# Patient Record
Sex: FEMALE | Race: WHITE | URBAN - METROPOLITAN AREA
[De-identification: names, ages, dates, MRNs, and addresses within clinical notes are randomized per-mention and may not be internally consistent; named-entity substitution may affect disease eponyms.]

---

## 2021-06-17 ENCOUNTER — OFFICE VISIT (OUTPATIENT)
Dept: URBAN - METROPOLITAN AREA CLINIC 31 | Facility: CLINIC | Age: 81
End: 2021-06-17
Payer: MEDICARE

## 2021-06-17 DIAGNOSIS — H43.813 VITREOUS DEGENERATION, BILATERAL: ICD-10-CM

## 2021-06-17 DIAGNOSIS — H35.3132 NONEXUDATIVE AGE-RELATED MACULAR DEGENERATION, BILATERAL, INTERMEDIATE DRY STAGE: ICD-10-CM

## 2021-06-17 DIAGNOSIS — H43.811 VITREOUS DEGENERATION, RIGHT EYE: ICD-10-CM

## 2021-06-17 PROCEDURE — 92012 INTRM OPH EXAM EST PATIENT: CPT | Performed by: OPHTHALMOLOGY

## 2021-06-17 PROCEDURE — 92134 CPTRZ OPH DX IMG PST SGM RTA: CPT | Performed by: OPHTHALMOLOGY

## 2021-06-17 PROCEDURE — 67028 INJECTION EYE DRUG: CPT | Performed by: OPHTHALMOLOGY

## 2021-06-17 ASSESSMENT — INTRAOCULAR PRESSURE
OD: 12
OS: 10

## 2021-06-17 NOTE — IMPRESSION/PLAN
Impression: Trib rtnl vein occlusion, right eye, with macular edema: C13.3616.
s/p Avastin OD 3/22/18
- new CRVO OD 6/17/2021 Plan: Patient notes a decline in vision over the last 3 wks. Exam and OCT confirm edema with new CRVO. No NVD/NVE. Discussed r/b/a of antiVEGF vs laser vs observation. Recommend Avastin injection OD today. R/B/A discussed and patient elects to proceed. Intravitreal Avastin injection was performed today OD without complication. 6 wks with OCT OU re-eval Avastin OD.

## 2021-07-29 ENCOUNTER — OFFICE VISIT (OUTPATIENT)
Dept: URBAN - METROPOLITAN AREA CLINIC 31 | Facility: CLINIC | Age: 81
End: 2021-07-29
Payer: MEDICARE

## 2021-07-29 DIAGNOSIS — H35.3122 NONEXUDATIVE AGE-RELATED MACULAR DEGENERATION, LEFT EYE, INTERMEDIATE DRY STAGE: ICD-10-CM

## 2021-07-29 DIAGNOSIS — H35.3211 EXUDATIVE AGE-RELATED MACULAR DEGENERATION, RIGHT EYE, WITH ACTIVE CHOROIDAL NEOVASCULARIZATION: ICD-10-CM

## 2021-07-29 PROCEDURE — 92134 CPTRZ OPH DX IMG PST SGM RTA: CPT | Performed by: OPHTHALMOLOGY

## 2021-07-29 PROCEDURE — 99214 OFFICE O/P EST MOD 30 MIN: CPT | Performed by: OPHTHALMOLOGY

## 2021-07-29 ASSESSMENT — INTRAOCULAR PRESSURE
OD: 13
OS: 12

## 2021-07-29 NOTE — IMPRESSION/PLAN
Impression: Trib rtnl vein occlusion, right eye, with macular edema: Z74.5181.
s/p Avastin OD 3/22/18
- new CRVO OD 6/17/2021 Plan: Patient noted a decline in vision over the with onset of the new CRVO; however, there has not been any change with antiVEGF injection # 1. Exam and OCT confirm continued minimal CME after CRVO. No NVD/NVE. Discussed r/b/a of antiVEGF vs laser vs observation. Recommend observation today. R/B/A discussed and patient elects to observe. 6-8 wks with OCT OU re-eval Avastin OD.

## 2021-07-29 NOTE — IMPRESSION/PLAN
Impression: Glaucoma Suspect: H40.9. Plan: Strong family history. Patient started on timolol 7/28/2021 with Dr. Bao James. Rec observation.

## 2021-09-23 ENCOUNTER — OFFICE VISIT (OUTPATIENT)
Dept: URBAN - METROPOLITAN AREA CLINIC 31 | Facility: CLINIC | Age: 81
End: 2021-09-23
Payer: MEDICARE

## 2021-09-23 PROCEDURE — 92134 CPTRZ OPH DX IMG PST SGM RTA: CPT | Performed by: OPHTHALMOLOGY

## 2021-09-23 PROCEDURE — 92014 COMPRE OPH EXAM EST PT 1/>: CPT | Performed by: OPHTHALMOLOGY

## 2021-09-23 ASSESSMENT — INTRAOCULAR PRESSURE
OS: 10
OD: 11

## 2021-09-23 NOTE — IMPRESSION/PLAN
Impression: Trib rtnl vein occlusion, right eye, with macular edema: O46.3511.
s/p Avastin OD 07/26/2018
- new CRVO OD 6/17/2021, - s/p Avastin OD 6/17/2021 Plan: Patient noted a decline in vision over the with onset of the new CRVO; however, there has not been any change with antiVEGF injection # 1. Exam and OCT confirm slight recurrence of minimal CME 3 mo after Avastin after CRVO. No NVD/NVE. Discussed r/b/a of antiVEGF vs laser vs observation. Recommend observation today. R/B/A discussed and patient elects to observe. Discussed that repeat injections would be required if CME worsens. 4 wks with OCT OU re-eval Avastin OD.

## 2021-09-23 NOTE — IMPRESSION/PLAN
Impression: Glaucoma Suspect: H40.9. Plan: Strong family history. Patient started on timolol 7/28/2021 with Dr. Natalie Mcclain. Benjamin Milner was recently added as well. Rec observation.

## 2021-10-28 ENCOUNTER — OFFICE VISIT (OUTPATIENT)
Dept: URBAN - METROPOLITAN AREA CLINIC 31 | Facility: CLINIC | Age: 81
End: 2021-10-28
Payer: MEDICARE

## 2021-10-28 DIAGNOSIS — Z96.1 PRESENCE OF INTRAOCULAR LENS: ICD-10-CM

## 2021-10-28 PROCEDURE — 67028 INJECTION EYE DRUG: CPT | Performed by: OPHTHALMOLOGY

## 2021-10-28 PROCEDURE — 92134 CPTRZ OPH DX IMG PST SGM RTA: CPT | Performed by: OPHTHALMOLOGY

## 2021-10-28 PROCEDURE — 92012 INTRM OPH EXAM EST PATIENT: CPT | Performed by: OPHTHALMOLOGY

## 2021-10-28 ASSESSMENT — INTRAOCULAR PRESSURE
OS: 10
OD: 19

## 2021-10-28 NOTE — IMPRESSION/PLAN
Impression: Trib rtnl vein occlusion, right eye, with macular edema: Q48.7950.
s/p Avastin OD 07/26/2018
- new CRVO OD 6/17/2021, - s/p Avastin OD 06/17/2021 Plan: Patient noted a decline in vision over the last month extending from 3 mo to 4 mo after Avastin #1. Exam and OCT confirm worse recurrence of CME 4 mo after Avastin after CRVO. No NVD/NVE. Discussed r/b/a of antiVEGF vs laser vs observation. Recommend Avastin today and taper to 3 mo appt. R/B/A discussed and patient elects to proceed. Intravitreal Avastin injected OD without complication. 3 mo with OCT OU re-eval Avastin OD.

## 2021-10-28 NOTE — IMPRESSION/PLAN
Impression: Glaucoma Suspect: H40.9.
- possible allergy to Vyzulta Plan: Strong family history. Patient started on timolol 7/28/2021 with Dr. Umm Mariano. She is now off all drops. IOP 19 today. Rec observation and follow up with Dr. Umm Mariano.

## 2022-01-27 ENCOUNTER — OFFICE VISIT (OUTPATIENT)
Dept: URBAN - METROPOLITAN AREA CLINIC 31 | Facility: CLINIC | Age: 82
End: 2022-01-27
Payer: MEDICARE

## 2022-01-27 PROCEDURE — 92012 INTRM OPH EXAM EST PATIENT: CPT | Performed by: OPHTHALMOLOGY

## 2022-01-27 PROCEDURE — 67028 INJECTION EYE DRUG: CPT | Performed by: OPHTHALMOLOGY

## 2022-01-27 PROCEDURE — 92134 CPTRZ OPH DX IMG PST SGM RTA: CPT | Performed by: OPHTHALMOLOGY

## 2022-01-27 ASSESSMENT — INTRAOCULAR PRESSURE
OS: 9
OD: 15

## 2022-01-27 NOTE — IMPRESSION/PLAN
Impression: Glaucoma Suspect: H40.9.
- possible allergy to Vyzulta Plan: Strong family history. Patient started on timolol 7/28/2021 with Dr. Matthew Walker. She is now off all drops. IOP 15 today. Rec observation and follow up with Dr. Matthew Walker.

## 2022-01-27 NOTE — IMPRESSION/PLAN
Impression: Trib rtnl vein occlusion, right eye, with macular edema: H11.8613.
s/p Avastin OD 07/26/2018
- new CRVO OD 6/17/2021, - s/p Avastin OD 10/28/2021 Plan: Patient noted a decline in vision over the last month even from tapering from 4 mo to 3 mo after Avastin. Exam and OCT confirm worse CME after Avastin after CRVO. No NVD/NVE. Discussed r/b/a of antiVEGF vs laser vs observation. Recommend Avastin today and taper to 2 mo appt. R/B/A discussed and patient elects to proceed. Intravitreal Avastin injected OD without complication. 2 mo with OCT OU re-eval Avastin OD.

## 2022-03-24 ENCOUNTER — OFFICE VISIT (OUTPATIENT)
Dept: URBAN - METROPOLITAN AREA CLINIC 31 | Facility: CLINIC | Age: 82
End: 2022-03-24
Payer: MEDICARE

## 2022-03-24 DIAGNOSIS — H40.9 GLAUCOMA: ICD-10-CM

## 2022-03-24 DIAGNOSIS — H34.8310 TRIB RTNL VEIN OCCLUSION, RIGHT EYE, WITH MACULAR EDEMA: ICD-10-CM

## 2022-03-24 PROCEDURE — 92012 INTRM OPH EXAM EST PATIENT: CPT | Performed by: OPHTHALMOLOGY

## 2022-03-24 PROCEDURE — 67028 INJECTION EYE DRUG: CPT | Performed by: OPHTHALMOLOGY

## 2022-03-24 PROCEDURE — 92134 CPTRZ OPH DX IMG PST SGM RTA: CPT | Performed by: OPHTHALMOLOGY

## 2022-03-24 ASSESSMENT — INTRAOCULAR PRESSURE
OS: 17
OD: 17

## 2022-03-24 NOTE — IMPRESSION/PLAN
Impression: Glaucoma Suspect: H40.9.
- possible allergy to Vyzulta Plan: Strong family history. Patient started on timolol 7/28/2021 with Dr. Darlyn Jackson. She is now off all drops. IOP 15 today. Rec observation and follow up with Dr. Darlyn Jackson.

## 2022-03-24 NOTE — IMPRESSION/PLAN
Impression: Trib rtnl vein occlusion, right eye, with macular edema: P97.4876.
s/p Avastin OD 07/26/2018
- new CRVO OD 6/17/2021
- s/p Avastin OD 01/27/2022 Plan: Patient notes an improvement in vision tapering from 3 mo to 2 mo after Avastin. Exam and OCT confirm improved CME after Avastin after CRVO. No NVD/NVE. Discussed r/b/a of antiVEGF vs laser vs observation. Recommend Avastin today and taper to 2 mo appt. R/B/A discussed and patient elects to proceed. Intravitreal Avastin injected OD without complication. 2 mo with OCT OU re-eval Avastin OD.

## 2022-03-24 NOTE — IMPRESSION/PLAN
Impression: Vitreous degeneration, bilateral: H43.813. Plan: Patient notes occ floaters. Exam demonstrates a PVD without any RD or RT on exam.  Discussed R/B/A of PPV vs observation for the floaters. The floaters are not debilitating and so observation was recommended. Reassurance provided. RDW discussed. Precautions discussed with the patient.

## 2022-06-02 ENCOUNTER — OFFICE VISIT (OUTPATIENT)
Dept: URBAN - METROPOLITAN AREA CLINIC 31 | Facility: CLINIC | Age: 82
End: 2022-06-02
Payer: MEDICARE

## 2022-06-02 DIAGNOSIS — H35.3132 NONEXUDATIVE AGE-RELATED MACULAR DEGENERATION, BILATERAL, INTERMEDIATE DRY STAGE: ICD-10-CM

## 2022-06-02 DIAGNOSIS — H43.813 VITREOUS DEGENERATION, BILATERAL: ICD-10-CM

## 2022-06-02 DIAGNOSIS — H34.8310 TRIB RTNL VEIN OCCLUSION, RIGHT EYE, WITH MACULAR EDEMA: ICD-10-CM

## 2022-06-02 DIAGNOSIS — Z96.1 PRESENCE OF INTRAOCULAR LENS: Primary | ICD-10-CM

## 2022-06-02 DIAGNOSIS — H40.9 GLAUCOMA: ICD-10-CM

## 2022-06-02 PROCEDURE — 67028 INJECTION EYE DRUG: CPT | Performed by: OPHTHALMOLOGY

## 2022-06-02 PROCEDURE — 92012 INTRM OPH EXAM EST PATIENT: CPT | Performed by: OPHTHALMOLOGY

## 2022-06-02 PROCEDURE — 92134 CPTRZ OPH DX IMG PST SGM RTA: CPT | Performed by: OPHTHALMOLOGY

## 2022-06-02 ASSESSMENT — INTRAOCULAR PRESSURE
OD: 15
OS: 13

## 2022-06-02 NOTE — IMPRESSION/PLAN
Impression: Glaucoma Suspect: H40.9.
- possible allergy to Vyzulta Plan: Strong family history. Patient started on timolol 7/28/2021 with Dr. Wes Rowe. She is now off all drops. IOP 15 today. Rec observation and follow up with Dr. Wes Rowe.

## 2022-06-02 NOTE — IMPRESSION/PLAN
Impression: Trib rtnl vein occlusion, right eye, with macular edema: I54.1676.
s/p Avastin OD 07/26/2018
- new CRVO OD 6/17/2021
- s/p Avastin OD 03/24/2022 Plan: Patient notes an improvement in vision tapering from 3 mo to 10 wks after Avastin. Exam and OCT confirm improved CME after Avastin after CRVO. No NVD/NVE. Discussed r/b/a of antiVEGF vs laser vs observation. Recommend Avastin today and maintain 10 wk appt. R/B/A discussed and patient elects to proceed. Intravitreal Avastin injected OD without complication. 10 wks with OCT OU re-eval Avastin OD.

## 2022-08-11 ENCOUNTER — OFFICE VISIT (OUTPATIENT)
Dept: URBAN - METROPOLITAN AREA CLINIC 31 | Facility: CLINIC | Age: 82
End: 2022-08-11
Payer: MEDICARE

## 2022-08-11 DIAGNOSIS — H34.8310 TRIB RTNL VEIN OCCLUSION, RIGHT EYE, WITH MACULAR EDEMA: Primary | ICD-10-CM

## 2022-08-11 DIAGNOSIS — H40.9 GLAUCOMA: ICD-10-CM

## 2022-08-11 DIAGNOSIS — H43.813 VITREOUS DEGENERATION, BILATERAL: ICD-10-CM

## 2022-08-11 DIAGNOSIS — H35.3132 NONEXUDATIVE AGE-RELATED MACULAR DEGENERATION, BILATERAL, INTERMEDIATE DRY STAGE: ICD-10-CM

## 2022-08-11 DIAGNOSIS — Z96.1 PRESENCE OF INTRAOCULAR LENS: ICD-10-CM

## 2022-08-11 PROCEDURE — 99214 OFFICE O/P EST MOD 30 MIN: CPT | Performed by: OPHTHALMOLOGY

## 2022-08-11 PROCEDURE — 92134 CPTRZ OPH DX IMG PST SGM RTA: CPT | Performed by: OPHTHALMOLOGY

## 2022-08-11 ASSESSMENT — INTRAOCULAR PRESSURE
OS: 13
OD: 18

## 2022-08-11 NOTE — IMPRESSION/PLAN
Impression: Glaucoma Suspect: H40.9.
- possible allergy to Vyzulta Plan: Strong family history. Patient started on timolol 7/28/2021 with Dr. Bridgette Shannon office. She is now off all drops. IOP 15 today.   Rec observation and follow up

## 2022-08-11 NOTE — IMPRESSION/PLAN
Impression: Trib rtnl vein occlusion, right eye, with macular edema: H34.8310.
-s/p Avastin OD 07/26/2018
- new CRVO OD 6/17/2021
- s/p Avastin OD 06/02/2022 Plan: In past, patient has noted an improvement in vision tapering from 3 mo to 10 wks after Avastin. Exam and OCT confirm improved CME after Avastin 10 mo ago. No NVD/NVE. Discussed r/b/a of antiVEGF vs laser vs observation. Patient elects close observation. Warning symptoms discussed. 2-4 wks with OCT OU re-eval Avastin OD.

## 2022-09-15 ENCOUNTER — OFFICE VISIT (OUTPATIENT)
Dept: URBAN - METROPOLITAN AREA CLINIC 31 | Facility: CLINIC | Age: 82
End: 2022-09-15
Payer: MEDICARE

## 2022-09-15 DIAGNOSIS — Z96.1 PRESENCE OF INTRAOCULAR LENS: ICD-10-CM

## 2022-09-15 DIAGNOSIS — H34.8310 TRIB RTNL VEIN OCCLUSION, RIGHT EYE, WITH MACULAR EDEMA: Primary | ICD-10-CM

## 2022-09-15 DIAGNOSIS — H35.3132 NONEXUDATIVE AGE-RELATED MACULAR DEGENERATION, BILATERAL, INTERMEDIATE DRY STAGE: ICD-10-CM

## 2022-09-15 DIAGNOSIS — H43.813 VITREOUS DEGENERATION, BILATERAL: ICD-10-CM

## 2022-09-15 DIAGNOSIS — H40.9 GLAUCOMA: ICD-10-CM

## 2022-09-15 PROCEDURE — 92134 CPTRZ OPH DX IMG PST SGM RTA: CPT | Performed by: OPHTHALMOLOGY

## 2022-09-15 PROCEDURE — 92014 COMPRE OPH EXAM EST PT 1/>: CPT | Performed by: OPHTHALMOLOGY

## 2022-09-15 ASSESSMENT — INTRAOCULAR PRESSURE
OS: 18
OD: 19

## 2022-09-15 NOTE — IMPRESSION/PLAN
Impression: Glaucoma Suspect: H40.9.
- possible allergy to Vyzulta Plan: Strong family history. Patient started on timolol 7/28/2021 with Dr. Lianna Odell office. She is now off all drops. IOP 15 today.   Rec observation and follow up

## 2022-09-15 NOTE — IMPRESSION/PLAN
Impression: Trib rtnl vein occlusion, right eye, with macular edema: H34.8310.
-s/p Avastin OD 07/26/2018
- new CRVO OD 6/17/2021
- s/p Avastin OD 06/02/2022 Plan: In past, patient has noted an improvement in vision tapering from 3 mo to 10 wks after Avastin. Exam and OCT confirm slightly worse CME after Avastin 3.5 mo ago; vision is worse OD. No NVD/NVE. Discussed r/b/a of antiVEGF vs laser vs observation. Patient elects close observation. Warning symptoms discussed. 3 wks with OCT OU re-eval Avastin OD.

## 2022-10-06 ENCOUNTER — OFFICE VISIT (OUTPATIENT)
Dept: URBAN - METROPOLITAN AREA CLINIC 31 | Facility: CLINIC | Age: 82
End: 2022-10-06
Payer: MEDICARE

## 2022-10-06 DIAGNOSIS — Z96.1 PRESENCE OF INTRAOCULAR LENS: ICD-10-CM

## 2022-10-06 DIAGNOSIS — H34.8310 TRIB RTNL VEIN OCCLUSION, RIGHT EYE, WITH MACULAR EDEMA: Primary | ICD-10-CM

## 2022-10-06 DIAGNOSIS — H35.3132 NONEXUDATIVE AGE-RELATED MACULAR DEGENERATION, BILATERAL, INTERMEDIATE DRY STAGE: ICD-10-CM

## 2022-10-06 DIAGNOSIS — H43.813 VITREOUS DEGENERATION, BILATERAL: ICD-10-CM

## 2022-10-06 DIAGNOSIS — H40.9 GLAUCOMA: ICD-10-CM

## 2022-10-06 PROCEDURE — 99214 OFFICE O/P EST MOD 30 MIN: CPT | Performed by: OPHTHALMOLOGY

## 2022-10-06 PROCEDURE — 92134 CPTRZ OPH DX IMG PST SGM RTA: CPT | Performed by: OPHTHALMOLOGY

## 2022-10-06 ASSESSMENT — INTRAOCULAR PRESSURE
OD: 17
OS: 13

## 2022-10-06 NOTE — IMPRESSION/PLAN
Impression: Glaucoma Suspect: H40.9.
- possible allergy to Vyzulta Plan: Strong family history. Patient started on timolol 7/28/2021 with Dr. Marce Ceballos office. She is now off all drops. IOP 15 today.   Rec observation and follow up

## 2022-10-06 NOTE — IMPRESSION/PLAN
Impression: Trib rtnl vein occlusion, right eye, with macular edema: H34.8310.
-s/p Avastin OD 07/26/2018
- new CRVO OD 6/17/2021
- s/p Avastin OD 06/02/2022 Plan: In past, patient has noted an improvement in vision tapering from 3 mo to 10 wks after Avastin. Exam and OCT confirm slightly worse CME after Avastin 4 mo ago; vision is worse OD. No NVD/NVE. Discussed r/b/a of antiVEGF vs laser vs observation. Patient elects close observation. Warning symptoms discussed. 6-8 wks with OCT OU re-eval Avastin OD.

## 2022-12-01 ENCOUNTER — OFFICE VISIT (OUTPATIENT)
Dept: URBAN - METROPOLITAN AREA CLINIC 31 | Facility: CLINIC | Age: 82
End: 2022-12-01
Payer: MEDICARE

## 2022-12-01 DIAGNOSIS — H35.3132 NONEXUDATIVE AGE-RELATED MACULAR DEGENERATION, BILATERAL, INTERMEDIATE DRY STAGE: ICD-10-CM

## 2022-12-01 DIAGNOSIS — H40.9 GLAUCOMA: ICD-10-CM

## 2022-12-01 DIAGNOSIS — H43.813 VITREOUS DEGENERATION, BILATERAL: ICD-10-CM

## 2022-12-01 DIAGNOSIS — H34.8310 TRIB RTNL VEIN OCCLUSION, RIGHT EYE, WITH MACULAR EDEMA: Primary | ICD-10-CM

## 2022-12-01 DIAGNOSIS — Z96.1 PRESENCE OF INTRAOCULAR LENS: ICD-10-CM

## 2022-12-01 PROCEDURE — 67028 INJECTION EYE DRUG: CPT | Performed by: OPHTHALMOLOGY

## 2022-12-01 PROCEDURE — 99214 OFFICE O/P EST MOD 30 MIN: CPT | Performed by: OPHTHALMOLOGY

## 2022-12-01 PROCEDURE — 92134 CPTRZ OPH DX IMG PST SGM RTA: CPT | Performed by: OPHTHALMOLOGY

## 2022-12-01 NOTE — IMPRESSION/PLAN
Impression: Glaucoma Suspect: H40.9.
- possible allergy to Vyzulta Plan: Strong family history. Patient started on timolol 7/28/2021 with Dr. Rowan Richards office. She is now off all drops. IOP 15 today.   Rec observation and follow up

## 2022-12-01 NOTE — IMPRESSION/PLAN
Impression: Trib rtnl vein occlusion, right eye, with macular edema: H34.8310.
-s/p Avastin OD 07/26/2018
- new CRVO OD 6/17/2021
- s/p Avastin OD 06/02/2022 Plan: In past, patient has noted an improvement in vision tapering from 3 mo to 10 wks after Avastin. Exam and OCT confirm slightly worse CME after Avastin extending from 3 mo to 6 mo; vision is worse OD. No NVD/NVE. Discussed r/b/a of antiVEGF vs laser vs observation. Rec Avastin OD today. Warning symptoms discussed. Intravitreal Avastin injected OD today without complication. 4 mo with OCT OU re-eval Avastin OD.

## 2023-03-24 ENCOUNTER — OFFICE VISIT (OUTPATIENT)
Dept: URBAN - METROPOLITAN AREA CLINIC 41 | Facility: CLINIC | Age: 83
End: 2023-03-24
Payer: MEDICARE

## 2023-03-24 DIAGNOSIS — H34.8310 TRIB RTNL VEIN OCCLUSION, RIGHT EYE, WITH MACULAR EDEMA: Primary | ICD-10-CM

## 2023-03-24 DIAGNOSIS — Z96.1 PRESENCE OF INTRAOCULAR LENS: ICD-10-CM

## 2023-03-24 DIAGNOSIS — H40.9 GLAUCOMA: ICD-10-CM

## 2023-03-24 DIAGNOSIS — H43.813 VITREOUS DEGENERATION, BILATERAL: ICD-10-CM

## 2023-03-24 DIAGNOSIS — H35.3132 NONEXUDATIVE AGE-RELATED MACULAR DEGENERATION, BILATERAL, INTERMEDIATE DRY STAGE: ICD-10-CM

## 2023-03-24 PROCEDURE — 92012 INTRM OPH EXAM EST PATIENT: CPT | Performed by: OPHTHALMOLOGY

## 2023-03-24 PROCEDURE — 92134 CPTRZ OPH DX IMG PST SGM RTA: CPT | Performed by: OPHTHALMOLOGY

## 2023-03-24 PROCEDURE — 67028 INJECTION EYE DRUG: CPT | Performed by: OPHTHALMOLOGY

## 2023-03-24 ASSESSMENT — INTRAOCULAR PRESSURE
OS: 16
OD: 15

## 2023-03-24 NOTE — IMPRESSION/PLAN
Impression: Presence of intraocular lens: Z96.1. OU. Plan: Stable and well centered. Obs OU. [id number] Lisandra Alexis for new MRx.

## 2023-03-24 NOTE — IMPRESSION/PLAN
Impression: Glaucoma Suspect: H40.9.
- possible allergy to Vyzulta Plan: Strong family history. Patient started on timolol 7/28/2021 with Dr. Pryor Arn office. She is now off all drops. IOP 15 today.   Rec observation and follow up

## 2023-03-24 NOTE — IMPRESSION/PLAN
Impression: Trib rtnl vein occlusion, right eye, with macular edema: H34.8310.
-s/p Avastin OD 07/26/2018
- new CRVO OD 6/17/2021
- s/p Avastin OD 12/01/2022 Plan: In past, patient has noted an improvement in vision tapering from 3 mo to 10 wks after Avastin. Exam and OCT confirm slightly worse CME after Avastin extending from 3 mo to 4 mo; vision is worse OD. No NVD/NVE. Discussed r/b/a of antiVEGF vs laser vs observation. Rec Avastin OD today. Warning symptoms discussed. Intravitreal Avastin injected OD today without complication. 3 mo with OCT OU re-eval Eylea OD.

## 2023-06-14 ENCOUNTER — OFFICE VISIT (OUTPATIENT)
Facility: LOCATION | Age: 83
End: 2023-06-14
Payer: MEDICARE

## 2023-06-14 DIAGNOSIS — H35.3132 NONEXUDATIVE AGE-RELATED MACULAR DEGENERATION, BILATERAL, INTERMEDIATE DRY STAGE: ICD-10-CM

## 2023-06-14 DIAGNOSIS — H34.8310 TRIB RTNL VEIN OCCLUSION, RIGHT EYE, WITH MACULAR EDEMA: Primary | ICD-10-CM

## 2023-06-14 DIAGNOSIS — H43.813 VITREOUS DEGENERATION, BILATERAL: ICD-10-CM

## 2023-06-14 DIAGNOSIS — Z96.1 PRESENCE OF INTRAOCULAR LENS: ICD-10-CM

## 2023-06-14 DIAGNOSIS — H40.9 GLAUCOMA: ICD-10-CM

## 2023-06-14 PROCEDURE — 92014 COMPRE OPH EXAM EST PT 1/>: CPT | Performed by: OPHTHALMOLOGY

## 2023-06-14 PROCEDURE — 92134 CPTRZ OPH DX IMG PST SGM RTA: CPT | Performed by: OPHTHALMOLOGY

## 2023-06-14 PROCEDURE — 67028 INJECTION EYE DRUG: CPT | Performed by: OPHTHALMOLOGY

## 2023-06-14 ASSESSMENT — INTRAOCULAR PRESSURE
OS: 13
OD: 25

## 2023-06-14 NOTE — IMPRESSION/PLAN
Impression: Presence of intraocular lens: Z96.1. OU. Plan: Stable and well centered. Obs OU. [id number] Lisandra Alexis for new MRx.

## 2023-06-14 NOTE — IMPRESSION/PLAN
Impression: Glaucoma Suspect: H40.9.
- possible allergy to Vyzulta Plan: Strong family history. Patient started on timolol 7/28/2021 with Dr. Yamilka Hui office. She is now off all drops. IOP borderline today OD.   Rec observation and follow up

## 2023-06-14 NOTE — IMPRESSION/PLAN
Impression: Trib rtnl vein occlusion, right eye, with macular edema: P54.4972.
-s/p Avastin OD 07/26/2018
- new CRVO OD 6/17/2021, h/o worsening between 3 mo to 4 mo. - s/p Avastin OD 3/24/2023 Plan: Exam and OCT confirm improved CME after Avastin tapering from 4 mo to 3 mo; vision is worse OD. No NVD/NVE. Discussed r/b/a of antiVEGF vs laser vs observation. Rec switch to Eylea OD today. Warning symptoms discussed. Intravitreal Eylea injected OD today without complication. 3 mo with OCT OU re-eval Eylea OD.

## 2023-10-04 ENCOUNTER — OFFICE VISIT (OUTPATIENT)
Dept: URBAN - METROPOLITAN AREA CLINIC 41 | Facility: CLINIC | Age: 83
End: 2023-10-04
Payer: MEDICARE

## 2023-10-04 DIAGNOSIS — H40.9 GLAUCOMA: ICD-10-CM

## 2023-10-04 DIAGNOSIS — H34.8310 TRIB RTNL VEIN OCCLUSION, RIGHT EYE, WITH MACULAR EDEMA: Primary | ICD-10-CM

## 2023-10-04 DIAGNOSIS — H35.3132 NONEXUDATIVE AGE-RELATED MACULAR DEGENERATION, BILATERAL, INTERMEDIATE DRY STAGE: ICD-10-CM

## 2023-10-04 DIAGNOSIS — Z96.1 PRESENCE OF INTRAOCULAR LENS: ICD-10-CM

## 2023-10-04 DIAGNOSIS — H43.813 VITREOUS DEGENERATION, BILATERAL: ICD-10-CM

## 2023-10-04 PROCEDURE — 92134 CPTRZ OPH DX IMG PST SGM RTA: CPT | Performed by: OPHTHALMOLOGY

## 2023-10-04 PROCEDURE — 92014 COMPRE OPH EXAM EST PT 1/>: CPT | Performed by: OPHTHALMOLOGY

## 2023-10-04 PROCEDURE — 67028 INJECTION EYE DRUG: CPT | Performed by: OPHTHALMOLOGY

## 2023-10-04 ASSESSMENT — INTRAOCULAR PRESSURE
OD: 10
OS: 11

## 2024-03-20 ENCOUNTER — OFFICE VISIT (OUTPATIENT)
Dept: URBAN - METROPOLITAN AREA CLINIC 41 | Facility: CLINIC | Age: 84
End: 2024-03-20
Payer: MEDICARE

## 2024-03-20 DIAGNOSIS — H43.813 VITREOUS DEGENERATION, BILATERAL: ICD-10-CM

## 2024-03-20 DIAGNOSIS — H40.9 GLAUCOMA: ICD-10-CM

## 2024-03-20 DIAGNOSIS — H34.8310 TRIBUTARY (BRANCH) RETINAL VEIN OCCLUSION, RIGHT EYE, WITH MACULAR EDEMA: Primary | ICD-10-CM

## 2024-03-20 DIAGNOSIS — H35.3132 NONEXUDATIVE AGE-RELATED MACULAR DEGENERATION, BILATERAL, INTERMEDIATE DRY STAGE: ICD-10-CM

## 2024-03-20 DIAGNOSIS — Z96.1 PRESENCE OF INTRAOCULAR LENS: ICD-10-CM

## 2024-03-20 PROCEDURE — 92134 CPTRZ OPH DX IMG PST SGM RTA: CPT | Performed by: OPHTHALMOLOGY

## 2024-03-20 PROCEDURE — 67028 INJECTION EYE DRUG: CPT | Performed by: OPHTHALMOLOGY

## 2024-03-20 PROCEDURE — 92014 COMPRE OPH EXAM EST PT 1/>: CPT | Performed by: OPHTHALMOLOGY

## 2024-03-20 ASSESSMENT — INTRAOCULAR PRESSURE
OS: 14
OD: 13

## 2025-01-22 ENCOUNTER — OFFICE VISIT (OUTPATIENT)
Dept: URBAN - METROPOLITAN AREA CLINIC 41 | Facility: CLINIC | Age: 85
End: 2025-01-22
Payer: MEDICARE

## 2025-01-22 DIAGNOSIS — Z96.1 PRESENCE OF INTRAOCULAR LENS: ICD-10-CM

## 2025-01-22 DIAGNOSIS — H43.813 VITREOUS DEGENERATION, BILATERAL: ICD-10-CM

## 2025-01-22 DIAGNOSIS — H40.9 GLAUCOMA: ICD-10-CM

## 2025-01-22 DIAGNOSIS — H35.3132 NONEXUDATIVE AGE-RELATED MACULAR DEGENERATION, BILATERAL, INTERMEDIATE DRY STAGE: ICD-10-CM

## 2025-01-22 DIAGNOSIS — H34.8310 TRIBUTARY (BRANCH) RETINAL VEIN OCCLUSION, RIGHT EYE, WITH MACULAR EDEMA: Primary | ICD-10-CM

## 2025-01-22 PROCEDURE — 67028 INJECTION EYE DRUG: CPT | Performed by: OPHTHALMOLOGY

## 2025-01-22 PROCEDURE — 92134 CPTRZ OPH DX IMG PST SGM RTA: CPT | Performed by: OPHTHALMOLOGY

## 2025-01-22 PROCEDURE — 99214 OFFICE O/P EST MOD 30 MIN: CPT | Performed by: OPHTHALMOLOGY

## 2025-01-22 ASSESSMENT — INTRAOCULAR PRESSURE
OS: 14
OD: 13